# Patient Record
Sex: FEMALE | Race: WHITE | Employment: UNEMPLOYED | URBAN - METROPOLITAN AREA
[De-identification: names, ages, dates, MRNs, and addresses within clinical notes are randomized per-mention and may not be internally consistent; named-entity substitution may affect disease eponyms.]

---

## 2021-07-10 ENCOUNTER — HOSPITAL ENCOUNTER (EMERGENCY)
Age: 86
Discharge: HOME | End: 2021-07-10
Attending: EMERGENCY MEDICINE
Payer: MEDICARE

## 2021-07-10 ENCOUNTER — APPOINTMENT (OUTPATIENT)
Dept: ULTRASOUND IMAGING | Age: 86
End: 2021-07-10
Payer: MEDICARE

## 2021-07-10 ENCOUNTER — APPOINTMENT (OUTPATIENT)
Dept: GENERAL RADIOLOGY | Age: 86
End: 2021-07-10
Payer: MEDICARE

## 2021-07-10 VITALS
OXYGEN SATURATION: 99 % | WEIGHT: 123 LBS | BODY MASS INDEX: 19.3 KG/M2 | SYSTOLIC BLOOD PRESSURE: 194 MMHG | HEART RATE: 70 BPM | RESPIRATION RATE: 16 BRPM | TEMPERATURE: 98.4 F | HEIGHT: 67 IN | DIASTOLIC BLOOD PRESSURE: 67 MMHG

## 2021-07-10 DIAGNOSIS — M17.12 OSTEOARTHRITIS OF LEFT KNEE, UNSPECIFIED OSTEOARTHRITIS TYPE: Primary | ICD-10-CM

## 2021-07-10 LAB
ALBUMIN SERPL-MCNC: 4 G/DL (ref 3.5–4.6)
ALP BLD-CCNC: 101 U/L (ref 40–130)
ALT SERPL-CCNC: 6 U/L (ref 0–33)
ANION GAP SERPL CALCULATED.3IONS-SCNC: 11 MEQ/L (ref 9–15)
AST SERPL-CCNC: 14 U/L (ref 0–35)
BASOPHILS ABSOLUTE: 0 K/UL (ref 0–0.1)
BASOPHILS RELATIVE PERCENT: 0.5 % (ref 0.1–1.2)
BILIRUB SERPL-MCNC: 0.7 MG/DL (ref 0.2–0.7)
BUN BLDV-MCNC: 16 MG/DL (ref 8–23)
CALCIUM SERPL-MCNC: 9.2 MG/DL (ref 8.5–9.9)
CHLORIDE BLD-SCNC: 99 MEQ/L (ref 95–107)
CO2: 26 MEQ/L (ref 20–31)
CREAT SERPL-MCNC: 0.45 MG/DL (ref 0.5–0.9)
D DIMER: 0.96 MG/L FEU (ref 0–0.5)
EOSINOPHILS ABSOLUTE: 0 K/UL (ref 0–0.4)
EOSINOPHILS RELATIVE PERCENT: 0.7 % (ref 0.7–5.8)
GFR AFRICAN AMERICAN: >60
GFR NON-AFRICAN AMERICAN: >60
GLOBULIN: 3.3 G/DL (ref 2.3–3.5)
GLUCOSE BLD-MCNC: 111 MG/DL (ref 70–99)
HCT VFR BLD CALC: 39.3 % (ref 37–47)
HEMOGLOBIN: 12.7 G/DL (ref 11.2–15.7)
IMMATURE GRANULOCYTES #: 0 K/UL
IMMATURE GRANULOCYTES %: 0.2 %
LYMPHOCYTES ABSOLUTE: 1.5 K/UL (ref 1.2–3.7)
LYMPHOCYTES RELATIVE PERCENT: 24.9 %
MCH RBC QN AUTO: 28.8 PG (ref 25.6–32.2)
MCHC RBC AUTO-ENTMCNC: 32.3 % (ref 32.2–35.5)
MCV RBC AUTO: 89.1 FL (ref 79.4–94.8)
MONOCYTES ABSOLUTE: 0.6 K/UL (ref 0.2–0.9)
MONOCYTES RELATIVE PERCENT: 9.7 % (ref 4.7–12.5)
NEUTROPHILS ABSOLUTE: 3.8 K/UL (ref 1.6–6.1)
NEUTROPHILS RELATIVE PERCENT: 64 % (ref 34–71.1)
PDW BLD-RTO: 14.5 % (ref 11.7–14.4)
PLATELET # BLD: 240 K/UL (ref 182–369)
POTASSIUM SERPL-SCNC: 4.4 MEQ/L (ref 3.4–4.9)
RBC # BLD: 4.41 M/UL (ref 3.93–5.22)
SODIUM BLD-SCNC: 136 MEQ/L (ref 135–144)
TOTAL PROTEIN: 7.3 G/DL (ref 6.3–8)
WBC # BLD: 5.9 K/UL (ref 4–10)

## 2021-07-10 PROCEDURE — 6370000000 HC RX 637 (ALT 250 FOR IP): Performed by: EMERGENCY MEDICINE

## 2021-07-10 PROCEDURE — 73564 X-RAY EXAM KNEE 4 OR MORE: CPT

## 2021-07-10 PROCEDURE — 85025 COMPLETE CBC W/AUTO DIFF WBC: CPT

## 2021-07-10 PROCEDURE — 99284 EMERGENCY DEPT VISIT MOD MDM: CPT

## 2021-07-10 PROCEDURE — 85379 FIBRIN DEGRADATION QUANT: CPT

## 2021-07-10 PROCEDURE — 80053 COMPREHEN METABOLIC PANEL: CPT

## 2021-07-10 PROCEDURE — 36415 COLL VENOUS BLD VENIPUNCTURE: CPT

## 2021-07-10 PROCEDURE — 93971 EXTREMITY STUDY: CPT

## 2021-07-10 RX ORDER — KETOROLAC TROMETHAMINE 10 MG/1
20 TABLET, FILM COATED ORAL ONCE
Status: COMPLETED | OUTPATIENT
Start: 2021-07-10 | End: 2021-07-10

## 2021-07-10 RX ORDER — LEVOTHYROXINE SODIUM 0.03 MG/1
25 TABLET ORAL DAILY
COMMUNITY

## 2021-07-10 RX ORDER — LISINOPRIL 40 MG/1
40 TABLET ORAL DAILY
COMMUNITY

## 2021-07-10 RX ORDER — KETOROLAC TROMETHAMINE 10 MG/1
10 TABLET, FILM COATED ORAL EVERY 6 HOURS PRN
Qty: 20 TABLET | Refills: 0 | Status: SHIPPED | OUTPATIENT
Start: 2021-07-10

## 2021-07-10 RX ORDER — AMLODIPINE BESYLATE 10 MG/1
TABLET ORAL
COMMUNITY

## 2021-07-10 RX ORDER — TRAMADOL HYDROCHLORIDE 50 MG/1
50 TABLET ORAL PRN
COMMUNITY

## 2021-07-10 RX ORDER — PREDNISONE 10 MG/1
60 TABLET ORAL DAILY
Qty: 30 TABLET | Refills: 0 | Status: SHIPPED | OUTPATIENT
Start: 2021-07-10 | End: 2021-07-15

## 2021-07-10 RX ORDER — PREDNISONE 20 MG/1
40 TABLET ORAL ONCE
Status: COMPLETED | OUTPATIENT
Start: 2021-07-10 | End: 2021-07-10

## 2021-07-10 RX ADMIN — PREDNISONE 40 MG: 20 TABLET ORAL at 14:20

## 2021-07-10 RX ADMIN — KETOROLAC TROMETHAMINE 20 MG: 10 TABLET, FILM COATED ORAL at 14:19

## 2021-07-10 ASSESSMENT — ENCOUNTER SYMPTOMS
SORE THROAT: 0
COLOR CHANGE: 0
BACK PAIN: 0
PHOTOPHOBIA: 0
COUGH: 0
SINUS PRESSURE: 0
SHORTNESS OF BREATH: 0
DIARRHEA: 0
APNEA: 0
RHINORRHEA: 0
EYE PAIN: 0
CONSTIPATION: 0
ABDOMINAL PAIN: 0
ABDOMINAL DISTENTION: 0
VOMITING: 0
NAUSEA: 0
WHEEZING: 0

## 2021-07-10 ASSESSMENT — PAIN SCALES - GENERAL
PAINLEVEL_OUTOF10: 10
PAINLEVEL_OUTOF10: 10

## 2021-07-10 ASSESSMENT — PAIN DESCRIPTION - FREQUENCY: FREQUENCY: CONTINUOUS

## 2021-07-10 ASSESSMENT — PAIN DESCRIPTION - ORIENTATION: ORIENTATION: LEFT

## 2021-07-10 ASSESSMENT — PAIN DESCRIPTION - ONSET: ONSET: ON-GOING

## 2021-07-10 ASSESSMENT — PAIN DESCRIPTION - DESCRIPTORS: DESCRIPTORS: CONSTANT

## 2021-07-10 ASSESSMENT — PAIN DESCRIPTION - LOCATION: LOCATION: LEG

## 2021-07-10 ASSESSMENT — PAIN DESCRIPTION - PAIN TYPE: TYPE: ACUTE PAIN

## 2021-07-10 NOTE — ED NOTES
[facility] from [address] called her eta is 35 minutes.   Nithya Almanzar  07/10/21 3308

## 2021-07-10 NOTE — ED PROVIDER NOTES
[facility] ED  eMERGENCY dEPARTMENT eNCOUnter      Pt Name: Clovis Lizarraga  MRN: [id number]  [place] 4/25/1929  Date of evaluation: 7/10/2021  Provider: Senthil Osei MD    [address] [place] [address]       Chief Complaint   Patient presents with    Leg Pain     Pt's had leg pain x 2-3 days, pain is in the left knee and radiates up into hip area, denies injury         HISTORY OF PRESENT ILLNESS   (Location/Symptom, Timing/Onset,Context/Setting, Quality, Duration, Modifying Factors, Severity)  Note limiting factors. Clovis Lizarraga is a 80 y.o. female who presents to the emergency department with complaint of left knee pain of 2 to 3 days duration. Patient has history of bilateral knee arthritis. She uses wheelchair at home. Denies any new injuries. Denies fever or chills. Pain is 7 in a scale of 1-10 and worse with movement. Pain is sharp. Pain does not radiate. HPI    Nursing Notes were reviewed. REVIEW OF SYSTEMS    (2-9 systems for level 4, 10 or more for level 5)     Review of Systems   Constitutional: Negative. Negative for activity change, appetite change, chills, fatigue and fever. HENT: Negative for congestion, ear discharge, ear pain, hearing loss, rhinorrhea, sinus pressure and sore throat. Eyes: Negative for photophobia, pain and visual disturbance. Respiratory: Negative for apnea, cough, shortness of breath and wheezing. Cardiovascular: Negative for chest pain, palpitations and leg swelling. Gastrointestinal: Negative for abdominal distention, abdominal pain, constipation, diarrhea, nausea and vomiting. Endocrine: Negative for cold intolerance, heat intolerance and polyuria. Genitourinary: Negative for dysuria, flank pain, frequency and urgency. Musculoskeletal: Positive for arthralgias and gait problem. Negative for back pain, myalgias and neck stiffness. Skin: Negative for color change, pallor and rash.    Allergic/Immunologic: Negative for food allergies and immunocompromised state. Neurological: Negative for dizziness, tremors, syncope, weakness, light-headedness and headaches. Psychiatric/Behavioral: Negative for agitation, confusion and hallucinations. All other systems reviewed and are negative. Except as noted above the remainder of the review of systems was reviewed and negative. PAST MEDICAL HISTORY     Past Medical History:   Diagnosis Date    Hypertension     Osteoarthritis     Restless leg syndrome     Thyroid disease          SURGICAL HISTORY       Past Surgical History:   Procedure Laterality Date    ABDOMEN SURGERY  1963         CURRENT MEDICATIONS       Previous Medications    AMLODIPINE (NORVASC) 10 MG TABLET    Take by mouth    CHOLECALCIFEROL (VITAMIN D3) 125 MCG (5000 UT) TABS    Take 1 tablet by mouth daily    LEVOTHYROXINE (SYNTHROID) 25 MCG TABLET    Take 25 mcg by mouth Daily    LISINOPRIL (PRINIVIL;ZESTRIL) 40 MG TABLET    Take 40 mg by mouth daily    TRAMADOL (ULTRAM) 50 MG TABLET    Take 50 mg by mouth as needed for Pain. ALLERGIES     Patient has no known allergies. FAMILY HISTORY       Family History   Problem Relation Age of Onset    Heart Disease Mother     Coronary Art Dis Mother     Cancer Father           SOCIAL HISTORY       Social History     Socioeconomic History    Marital status:       Spouse name: Not on file    Number of children: Not on file    Years of education: Not on file    Highest education level: Not on file   Occupational History    Not on file   Tobacco Use    Smoking status: Never Smoker    Smokeless tobacco: Never Used   Vaping Use    Vaping Use: Never used   Substance and Sexual Activity    Alcohol use: Never    Drug use: Never    Sexual activity: Not on file   Other Topics Concern    Not on file   Social History Narrative    Not on file     Social Determinants of Health     Financial Resource Strain:     Difficulty of Paying Living Expenses:    Food Insecurity:  Worried About [facility] in the Last Year:    Lino Coley in the Last Year:    Transportation Needs:     Lack of Transportation (Medical):  Lack of Transportation (Non-Medical):    Physical Activity:     Days of Exercise per Week:     Minutes of Exercise per Session:    Stress:     Feeling of Stress :    Social Connections:     Frequency of Communication with Friends and Family:     Frequency of Social Gatherings with Friends and Family:     Attends Sikhism Services:     Active Member of Clubs or Organizations:     Attends Club or Organization Meetings:     Marital Status:    Intimate Partner Violence:     Fear of Current or Ex-Partner:     Emotionally Abused:     Physically Abused:     Sexually Abused:        SCREENINGS             PHYSICAL EXAM    (up to 7 for level 4, 8 or more for level 5)     ED Triage Vitals [07/10/21 1357]   BP Temp Temp Source Pulse Resp SpO2 Height Weight   (!) 174/107 98.4 °F (36.9 °C) Oral 88 16 98 % 5' 7\" (1.702 m) 123 lb (55.8 kg)       Physical Exam  Vitals and nursing note reviewed. Constitutional:       General: She is not in acute distress. Appearance: Normal appearance. She is well-developed and normal weight. She is not ill-appearing, toxic-appearing or diaphoretic. HENT:      Head: Normocephalic and atraumatic. Nose: Nose normal. No congestion or rhinorrhea. Mouth/Throat:      Mouth: Mucous membranes are moist.      Pharynx: Oropharynx is clear. No oropharyngeal exudate or posterior oropharyngeal erythema. Eyes:      General: No scleral icterus. Right eye: No discharge. Left eye: No discharge. Extraocular Movements: Extraocular movements intact. Conjunctiva/sclera: Conjunctivae normal.      Pupils: Pupils are equal, round, and reactive to light. Neck:      Thyroid: No thyromegaly. Vascular: No carotid bruit or JVD. Trachea: No tracheal deviation.    Cardiovascular:      Rate and Rhythm: Department Physician who either signs or Co-signs this chart in the absence of a cardiologist.        RADIOLOGY:   Non-plain film images such as CT, Ultrasound and MRI are read by the radiologist. Nam Aviles radiographicimages are visualized and preliminarily interpreted by the emergency physician with the below findings:        Interpretation per the Radiologist below, if available at the time of this note:    XR KNEE LEFT (MIN 4 VIEWS)   Final Result   There are and states degenerative osteoarthritic changes of the lateral compartment with bone-on-bone appearance. US DUP LOWER EXTREMITY LEFT SUMMER    (Results Pending)         ED BEDSIDE ULTRASOUND:   Performed by ED Physician - none    LABS:  Labs Reviewed   D-DIMER, QUANTITATIVE - Abnormal; Notable for the following components:       Result Value    D-Dimer, Quant 0.96 (*)     All other components within normal limits    Narrative:     Gerardo MCCLELLAN tel. [phone],  Coag results called to and read back by Singh, 07/10/2021 14:50, by [address] - Abnormal; Notable for the following components:    Glucose 111 (*)     CREATININE 0.45 (*)     All other components within normal limits   CBC WITH AUTO DIFFERENTIAL - Abnormal; Notable for the following components:    RDW 14.5 (*)     All other components within normal limits       All other labs were within normal range or not returned as of this dictation.     EMERGENCY DEPARTMENT COURSE and DIFFERENTIALDIAGNOSIS/MDM:   Vitals:    Vitals:    07/10/21 1357 07/10/21 1556   BP: (!) 174/107 (!) 194/67   Pulse: 88 70   Resp: 16 16   Temp: 98.4 °F (36.9 °C)    TempSrc: Oral    SpO2: 98% 99%   Weight: 123 lb (55.8 kg)    Height: 5' 7\" (1.702 m)            MDM  Number of Diagnoses or Management Options     Amount and/or Complexity of Data Reviewed  Clinical lab tests: reviewed and ordered  Tests in the radiology section of CPT®: reviewed and ordered    Risk of Complications, Morbidity, and/or Mortality  Presenting problems: moderate  Diagnostic procedures: moderate  Management options: moderate    Patient Progress  Patient progress: improved      CRITICAL CARE TIME   Total Critical Care time was  minutes, excluding separately reportable procedures. There was a high probability of clinically significant/life threatening deterioration in the patient's condition which required my urgentintervention. CONSULTS:  None    PROCEDURES:  Unless otherwise noted below, none     Procedures    FINAL IMPRESSION      1.  Osteoarthritis of left knee, unspecified osteoarthritis type          DISPOSITION/PLAN   DISPOSITION Decision To Discharge 07/10/2021 04:37:41 PM      PATIENT REFERRED TO:  Tasia Eid MD  [address] [place] [address]  [phone]    In 3 days      Tasia Eid MD  [address] [place] [address]  [phone]            DISCHARGE MEDICATIONS:  New Prescriptions    KETOROLAC (TORADOL) 10 MG TABLET    Take 1 tablet by mouth every 6 hours as needed for Pain    PREDNISONE (DELTASONE) 10 MG TABLET    Take 6 tablets by mouth daily for 5 doses          (Please note that portions of this note were completed with a voice recognitionprogram.  Efforts were made to edit the dictations but occasionally words are mis-transcribed.)    Nathanael Govea MD (electronically signed)  Attending Emergency Physician          Nathanael Govea MD  07/10/21 0057
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
## 2023-06-28 LAB
ANION GAP IN SER/PLAS: 13 MMOL/L (ref 10–20)
CALCIUM (MG/DL) IN SER/PLAS: 9 MG/DL (ref 8.6–10.3)
CARBON DIOXIDE, TOTAL (MMOL/L) IN SER/PLAS: 28 MMOL/L (ref 21–32)
CHLORIDE (MMOL/L) IN SER/PLAS: 99 MMOL/L (ref 98–107)
CREATININE (MG/DL) IN SER/PLAS: 0.58 MG/DL (ref 0.5–1.05)
GFR FEMALE: 84 ML/MIN/1.73M2
GLUCOSE (MG/DL) IN SER/PLAS: 90 MG/DL (ref 74–99)
POTASSIUM (MMOL/L) IN SER/PLAS: 4 MMOL/L (ref 3.5–5.3)
SODIUM (MMOL/L) IN SER/PLAS: 136 MMOL/L (ref 136–145)
UREA NITROGEN (MG/DL) IN SER/PLAS: 14 MG/DL (ref 6–23)

## 2024-07-18 ENCOUNTER — LAB (OUTPATIENT)
Dept: LAB | Facility: LAB | Age: 89
End: 2024-07-18
Payer: MEDICARE

## 2024-07-18 DIAGNOSIS — E78.5 HYPERLIPIDEMIA, UNSPECIFIED HYPERLIPIDEMIA TYPE: ICD-10-CM

## 2024-07-18 DIAGNOSIS — K21.9 GASTROESOPHAGEAL REFLUX DISEASE WITHOUT ESOPHAGITIS: ICD-10-CM

## 2024-07-18 DIAGNOSIS — I10 HYPERTENSION, UNSPECIFIED TYPE: ICD-10-CM

## 2024-07-18 DIAGNOSIS — E55.9 VITAMIN D DEFICIENCY: ICD-10-CM

## 2024-07-18 DIAGNOSIS — E03.9 HYPOTHYROIDISM, UNSPECIFIED TYPE: ICD-10-CM

## 2024-07-18 DIAGNOSIS — M19.91 PRIMARY OSTEOARTHRITIS, UNSPECIFIED SITE: ICD-10-CM

## 2024-07-18 DIAGNOSIS — Z13.220 SCREENING FOR LIPOID DISORDERS: ICD-10-CM

## 2024-07-18 LAB
25(OH)D3 SERPL-MCNC: 33 NG/ML (ref 30–100)
ALBUMIN SERPL BCP-MCNC: 3.7 G/DL (ref 3.4–5)
ALP SERPL-CCNC: 81 U/L (ref 33–136)
ALT SERPL W P-5'-P-CCNC: 7 U/L (ref 7–45)
ANION GAP SERPL CALC-SCNC: 13 MMOL/L (ref 10–20)
AST SERPL W P-5'-P-CCNC: 13 U/L (ref 9–39)
BASOPHILS # BLD AUTO: 0.05 X10*3/UL (ref 0–0.1)
BASOPHILS NFR BLD AUTO: 0.7 %
BILIRUB SERPL-MCNC: 1 MG/DL (ref 0–1.2)
BUN SERPL-MCNC: 11 MG/DL (ref 6–23)
CALCIUM SERPL-MCNC: 8.8 MG/DL (ref 8.6–10.3)
CHLORIDE SERPL-SCNC: 103 MMOL/L (ref 98–107)
CHOLEST SERPL-MCNC: 228 MG/DL (ref 0–199)
CHOLESTEROL/HDL RATIO: 4.3
CO2 SERPL-SCNC: 25 MMOL/L (ref 21–32)
CREAT SERPL-MCNC: 0.56 MG/DL (ref 0.5–1.05)
EGFRCR SERPLBLD CKD-EPI 2021: 84 ML/MIN/1.73M*2
EOSINOPHIL # BLD AUTO: 0.19 X10*3/UL (ref 0–0.4)
EOSINOPHIL NFR BLD AUTO: 2.8 %
ERYTHROCYTE [DISTWIDTH] IN BLOOD BY AUTOMATED COUNT: 14.7 % (ref 11.5–14.5)
GLUCOSE SERPL-MCNC: 98 MG/DL (ref 74–99)
HCT VFR BLD AUTO: 38.6 % (ref 36–46)
HDLC SERPL-MCNC: 53.1 MG/DL
HGB BLD-MCNC: 12.6 G/DL (ref 12–16)
IMM GRANULOCYTES # BLD AUTO: 0.01 X10*3/UL (ref 0–0.5)
IMM GRANULOCYTES NFR BLD AUTO: 0.1 % (ref 0–0.9)
LDLC SERPL CALC-MCNC: 153 MG/DL
LYMPHOCYTES # BLD AUTO: 1.52 X10*3/UL (ref 0.8–3)
LYMPHOCYTES NFR BLD AUTO: 22.1 %
MCH RBC QN AUTO: 29.2 PG (ref 26–34)
MCHC RBC AUTO-ENTMCNC: 32.6 G/DL (ref 32–36)
MCV RBC AUTO: 90 FL (ref 80–100)
MONOCYTES # BLD AUTO: 0.91 X10*3/UL (ref 0.05–0.8)
MONOCYTES NFR BLD AUTO: 13.2 %
NEUTROPHILS # BLD AUTO: 4.21 X10*3/UL (ref 1.6–5.5)
NEUTROPHILS NFR BLD AUTO: 61.1 %
NON HDL CHOLESTEROL: 175 MG/DL (ref 0–149)
NRBC BLD-RTO: 0 /100 WBCS (ref 0–0)
PLATELET # BLD AUTO: 281 X10*3/UL (ref 150–450)
POTASSIUM SERPL-SCNC: 3.9 MMOL/L (ref 3.5–5.3)
PROT SERPL-MCNC: 6.6 G/DL (ref 6.4–8.2)
RBC # BLD AUTO: 4.31 X10*6/UL (ref 4–5.2)
SODIUM SERPL-SCNC: 137 MMOL/L (ref 136–145)
TRIGL SERPL-MCNC: 110 MG/DL (ref 0–149)
TSH SERPL-ACNC: 4.2 MIU/L (ref 0.44–3.98)
VLDL: 22 MG/DL (ref 0–40)
WBC # BLD AUTO: 6.9 X10*3/UL (ref 4.4–11.3)

## 2024-07-18 PROCEDURE — 80061 LIPID PANEL: CPT

## 2024-07-18 PROCEDURE — 80053 COMPREHEN METABOLIC PANEL: CPT

## 2024-07-18 PROCEDURE — 36415 COLL VENOUS BLD VENIPUNCTURE: CPT

## 2024-07-18 PROCEDURE — 82306 VITAMIN D 25 HYDROXY: CPT

## 2024-07-18 PROCEDURE — 85025 COMPLETE CBC W/AUTO DIFF WBC: CPT

## 2024-07-18 PROCEDURE — 84443 ASSAY THYROID STIM HORMONE: CPT

## 2025-04-05 ENCOUNTER — HOSPITAL ENCOUNTER (EMERGENCY)
Facility: HOSPITAL | Age: OVER 89
Discharge: LEFT BEFORE DISCHARGE | End: 2025-04-05
Attending: STUDENT IN AN ORGANIZED HEALTH CARE EDUCATION/TRAINING PROGRAM
Payer: MEDICARE

## 2025-04-05 ENCOUNTER — APPOINTMENT (OUTPATIENT)
Dept: RADIOLOGY | Facility: HOSPITAL | Age: OVER 89
End: 2025-04-05
Payer: MEDICARE

## 2025-04-05 ENCOUNTER — APPOINTMENT (OUTPATIENT)
Dept: CARDIOLOGY | Facility: HOSPITAL | Age: OVER 89
End: 2025-04-05
Payer: MEDICARE

## 2025-04-05 VITALS
DIASTOLIC BLOOD PRESSURE: 73 MMHG | SYSTOLIC BLOOD PRESSURE: 146 MMHG | HEIGHT: 63 IN | BODY MASS INDEX: 20.38 KG/M2 | WEIGHT: 115 LBS | HEART RATE: 109 BPM | TEMPERATURE: 97.9 F | RESPIRATION RATE: 18 BRPM | OXYGEN SATURATION: 95 %

## 2025-04-05 DIAGNOSIS — R07.9 CHEST PAIN, UNSPECIFIED TYPE: ICD-10-CM

## 2025-04-05 DIAGNOSIS — R79.89 ELEVATED TROPONIN: Primary | ICD-10-CM

## 2025-04-05 LAB
ALBUMIN SERPL BCP-MCNC: 3.9 G/DL (ref 3.4–5)
ALP SERPL-CCNC: 88 U/L (ref 33–136)
ALT SERPL W P-5'-P-CCNC: 7 U/L (ref 7–45)
ANION GAP SERPL CALC-SCNC: 12 MMOL/L (ref 10–20)
AST SERPL W P-5'-P-CCNC: 14 U/L (ref 9–39)
BASOPHILS # BLD AUTO: 0.04 X10*3/UL (ref 0–0.1)
BASOPHILS NFR BLD AUTO: 0.5 %
BILIRUB SERPL-MCNC: 0.8 MG/DL (ref 0–1.2)
BNP SERPL-MCNC: 138 PG/ML (ref 0–99)
BUN SERPL-MCNC: 14 MG/DL (ref 6–23)
CALCIUM SERPL-MCNC: 9 MG/DL (ref 8.6–10.3)
CARDIAC TROPONIN I PNL SERPL HS: 120 NG/L (ref 0–13)
CARDIAC TROPONIN I PNL SERPL HS: 34 NG/L (ref 0–13)
CHLORIDE SERPL-SCNC: 98 MMOL/L (ref 98–107)
CO2 SERPL-SCNC: 26 MMOL/L (ref 21–32)
CREAT SERPL-MCNC: 0.52 MG/DL (ref 0.5–1.05)
EGFRCR SERPLBLD CKD-EPI 2021: 86 ML/MIN/1.73M*2
EOSINOPHIL # BLD AUTO: 0.02 X10*3/UL (ref 0–0.4)
EOSINOPHIL NFR BLD AUTO: 0.2 %
ERYTHROCYTE [DISTWIDTH] IN BLOOD BY AUTOMATED COUNT: 14.7 % (ref 11.5–14.5)
FLUAV RNA RESP QL NAA+PROBE: NOT DETECTED
FLUBV RNA RESP QL NAA+PROBE: NOT DETECTED
GLUCOSE SERPL-MCNC: 104 MG/DL (ref 74–99)
HCT VFR BLD AUTO: 38.8 % (ref 36–46)
HGB BLD-MCNC: 12.8 G/DL (ref 12–16)
IMM GRANULOCYTES # BLD AUTO: 0.03 X10*3/UL (ref 0–0.5)
IMM GRANULOCYTES NFR BLD AUTO: 0.4 % (ref 0–0.9)
LYMPHOCYTES # BLD AUTO: 1.33 X10*3/UL (ref 0.8–3)
LYMPHOCYTES NFR BLD AUTO: 15.6 %
MCH RBC QN AUTO: 29.4 PG (ref 26–34)
MCHC RBC AUTO-ENTMCNC: 33 G/DL (ref 32–36)
MCV RBC AUTO: 89 FL (ref 80–100)
MONOCYTES # BLD AUTO: 0.81 X10*3/UL (ref 0.05–0.8)
MONOCYTES NFR BLD AUTO: 9.5 %
NEUTROPHILS # BLD AUTO: 6.28 X10*3/UL (ref 1.6–5.5)
NEUTROPHILS NFR BLD AUTO: 73.8 %
NRBC BLD-RTO: 0 /100 WBCS (ref 0–0)
PLATELET # BLD AUTO: 268 X10*3/UL (ref 150–450)
POTASSIUM SERPL-SCNC: 3.6 MMOL/L (ref 3.5–5.3)
PROT SERPL-MCNC: 7.2 G/DL (ref 6.4–8.2)
RBC # BLD AUTO: 4.36 X10*6/UL (ref 4–5.2)
RSV RNA RESP QL NAA+PROBE: NOT DETECTED
SARS-COV-2 RNA RESP QL NAA+PROBE: NOT DETECTED
SODIUM SERPL-SCNC: 132 MMOL/L (ref 136–145)
WBC # BLD AUTO: 8.5 X10*3/UL (ref 4.4–11.3)

## 2025-04-05 PROCEDURE — 71045 X-RAY EXAM CHEST 1 VIEW: CPT

## 2025-04-05 PROCEDURE — 2550000001 HC RX 255 CONTRASTS: Performed by: STUDENT IN AN ORGANIZED HEALTH CARE EDUCATION/TRAINING PROGRAM

## 2025-04-05 PROCEDURE — 84484 ASSAY OF TROPONIN QUANT: CPT | Performed by: STUDENT IN AN ORGANIZED HEALTH CARE EDUCATION/TRAINING PROGRAM

## 2025-04-05 PROCEDURE — 93005 ELECTROCARDIOGRAM TRACING: CPT

## 2025-04-05 PROCEDURE — 83880 ASSAY OF NATRIURETIC PEPTIDE: CPT | Performed by: STUDENT IN AN ORGANIZED HEALTH CARE EDUCATION/TRAINING PROGRAM

## 2025-04-05 PROCEDURE — 36415 COLL VENOUS BLD VENIPUNCTURE: CPT | Performed by: STUDENT IN AN ORGANIZED HEALTH CARE EDUCATION/TRAINING PROGRAM

## 2025-04-05 PROCEDURE — 84075 ASSAY ALKALINE PHOSPHATASE: CPT | Performed by: STUDENT IN AN ORGANIZED HEALTH CARE EDUCATION/TRAINING PROGRAM

## 2025-04-05 PROCEDURE — 85025 COMPLETE CBC W/AUTO DIFF WBC: CPT | Performed by: STUDENT IN AN ORGANIZED HEALTH CARE EDUCATION/TRAINING PROGRAM

## 2025-04-05 PROCEDURE — 99285 EMERGENCY DEPT VISIT HI MDM: CPT | Mod: 25 | Performed by: STUDENT IN AN ORGANIZED HEALTH CARE EDUCATION/TRAINING PROGRAM

## 2025-04-05 PROCEDURE — 71275 CT ANGIOGRAPHY CHEST: CPT | Performed by: RADIOLOGY

## 2025-04-05 PROCEDURE — 96374 THER/PROPH/DIAG INJ IV PUSH: CPT | Mod: 59

## 2025-04-05 PROCEDURE — 87637 SARSCOV2&INF A&B&RSV AMP PRB: CPT | Performed by: STUDENT IN AN ORGANIZED HEALTH CARE EDUCATION/TRAINING PROGRAM

## 2025-04-05 PROCEDURE — 71045 X-RAY EXAM CHEST 1 VIEW: CPT | Performed by: RADIOLOGY

## 2025-04-05 PROCEDURE — 2500000001 HC RX 250 WO HCPCS SELF ADMINISTERED DRUGS (ALT 637 FOR MEDICARE OP): Performed by: STUDENT IN AN ORGANIZED HEALTH CARE EDUCATION/TRAINING PROGRAM

## 2025-04-05 PROCEDURE — 2500000004 HC RX 250 GENERAL PHARMACY W/ HCPCS (ALT 636 FOR OP/ED): Performed by: STUDENT IN AN ORGANIZED HEALTH CARE EDUCATION/TRAINING PROGRAM

## 2025-04-05 PROCEDURE — 71275 CT ANGIOGRAPHY CHEST: CPT

## 2025-04-05 RX ORDER — NAPROXEN SODIUM 220 MG/1
324 TABLET, FILM COATED ORAL ONCE
Status: COMPLETED | OUTPATIENT
Start: 2025-04-05 | End: 2025-04-05

## 2025-04-05 RX ORDER — ONDANSETRON HYDROCHLORIDE 2 MG/ML
4 INJECTION, SOLUTION INTRAVENOUS ONCE
Status: COMPLETED | OUTPATIENT
Start: 2025-04-05 | End: 2025-04-05

## 2025-04-05 RX ADMIN — ASPIRIN 324 MG: 81 TABLET, CHEWABLE ORAL at 17:26

## 2025-04-05 RX ADMIN — ONDANSETRON 4 MG: 2 INJECTION INTRAMUSCULAR; INTRAVENOUS at 13:21

## 2025-04-05 RX ADMIN — IOHEXOL 75 ML: 350 INJECTION, SOLUTION INTRAVENOUS at 15:29

## 2025-04-05 ASSESSMENT — LIFESTYLE VARIABLES
HAVE YOU EVER FELT YOU SHOULD CUT DOWN ON YOUR DRINKING: NO
EVER FELT BAD OR GUILTY ABOUT YOUR DRINKING: NO
HAVE PEOPLE ANNOYED YOU BY CRITICIZING YOUR DRINKING: NO
EVER HAD A DRINK FIRST THING IN THE MORNING TO STEADY YOUR NERVES TO GET RID OF A HANGOVER: NO
TOTAL SCORE: 0

## 2025-04-05 ASSESSMENT — PAIN - FUNCTIONAL ASSESSMENT
PAIN_FUNCTIONAL_ASSESSMENT: 0-10
PAIN_FUNCTIONAL_ASSESSMENT: 0-10

## 2025-04-05 ASSESSMENT — COLUMBIA-SUICIDE SEVERITY RATING SCALE - C-SSRS
2. HAVE YOU ACTUALLY HAD ANY THOUGHTS OF KILLING YOURSELF?: NO
6. HAVE YOU EVER DONE ANYTHING, STARTED TO DO ANYTHING, OR PREPARED TO DO ANYTHING TO END YOUR LIFE?: NO
1. IN THE PAST MONTH, HAVE YOU WISHED YOU WERE DEAD OR WISHED YOU COULD GO TO SLEEP AND NOT WAKE UP?: NO

## 2025-04-05 ASSESSMENT — PAIN SCALES - GENERAL
PAINLEVEL_OUTOF10: 0 - NO PAIN
PAINLEVEL_OUTOF10: 0 - NO PAIN

## 2025-04-05 ASSESSMENT — PAIN DESCRIPTION - PROGRESSION: CLINICAL_PROGRESSION: NOT CHANGED

## 2025-04-05 NOTE — ED PROVIDER NOTES
HPI   Chief Complaint   Patient presents with    Shortness of Breath       Patient is a 89-year-old female with history of hypertension, hypothyroid who presents for shortness of breath.  Patient was started on hydralazine, took her first dose this morning and shortly after felt short of breath.  States this occurred at 11 AM.  States she felt slightly nauseous as well.  No chest pain, fevers, chills, cough and runny nose, vomiting, diarrhea, abdominal pain, rashes.  No history of MI, CVA, DVT or PE, cardiac stents.  Denies tobacco or alcohol.  No oxygen use at home.  States she now feels well, slightly nauseous. States she has baseline intermittent leg edema for years, no worse than normal. Patient's daughter arrived later and states that she checked her blood pressure after the patient took her hydralazine and she had a systolic of 95.  States that is much lower than typical for her.              Patient History   No past medical history on file.  No past surgical history on file.  No family history on file.  Social History     Tobacco Use    Smoking status: Not on file    Smokeless tobacco: Not on file   Substance Use Topics    Alcohol use: Not on file    Drug use: Not on file       Physical Exam   ED Triage Vitals   Temp Pulse Resp BP   -- -- -- --      SpO2 Temp src Heart Rate Source Patient Position   -- -- -- --      BP Location FiO2 (%)     -- --       Physical Exam  Constitutional:       General: She is not in acute distress.  HENT:      Head: Normocephalic.   Eyes:      Extraocular Movements: Extraocular movements intact.      Conjunctiva/sclera: Conjunctivae normal.      Pupils: Pupils are equal, round, and reactive to light.   Cardiovascular:      Rate and Rhythm: Normal rate and regular rhythm.      Pulses: Normal pulses.      Heart sounds: Normal heart sounds.   Pulmonary:      Effort: Pulmonary effort is normal.      Breath sounds: Normal breath sounds.   Abdominal:      General: There is no  distension.      Palpations: Abdomen is soft. There is no mass.      Tenderness: There is no abdominal tenderness. There is no guarding.   Musculoskeletal:         General: No deformity.      Cervical back: Normal range of motion and neck supple.      Right lower leg: No edema.      Left lower leg: No edema.   Skin:     General: Skin is warm and dry.      Findings: No lesion or rash.   Neurological:      General: No focal deficit present.      Mental Status: She is alert and oriented to person, place, and time. Mental status is at baseline.      Cranial Nerves: No cranial nerve deficit.      Sensory: No sensory deficit.      Motor: No weakness.   Psychiatric:         Mood and Affect: Mood normal.           ED Course & MDM   Diagnoses as of 04/05/25 1941   Elevated troponin   Chest pain, unspecified type                 No data recorded     [place] Coma Scale Score: 15 (04/05/25 1300 : Prince SUSANA Terrazas RN)                           Medical Decision Making  EKG on my interpretation: Rate 97, rhythm regular, axis normal, , QRS 74, QTc 454, T waves: Mild inversion in aVL, lead I, ST segments: No elevations or depressions, interpretation: Normal sinus rhythm, no STEMI    EKG on my interpretation: Rate 88, rhythm regular, axis normal, , QRS 80, QTc 438, T waves: Mild inversion in aVL/lead I/lead II, ST segments: No elevations or depressions, interpretation: Normal sinus rhythm, no STEMI    Patient is a 89-year-old female with the above-stated past medical history who presents for shortness of breath.  Patient's EKGs do not show STEMI, her troponins are elevated and uptrending.  She was given aspirin.  Patient's heart score is a 5 making her moderate risk for major adverse cardiac event.  Advise she stay in the hospital for cardiology consult and further monitoring, however she declined this.  Patient has capacity.  CMP is grossly unremarkable.  BNP is mildly elevated, though she does not appear grossly fluid  overloaded.  CBC shows no leukocytosis or anemia.  Influenza, COVID, RSV are negative.  Chest x-ray showed a perihilar density in the left suspected to be summation of shadows.  CT angio was ordered which was negative for pulmonary embolism.  It did show a nodularity, patient was vies follow-up with her primary care doctor, she stated understanding agreement.  She was advised to follow-up with gastroenterology as well given the findings related to the esophagus.  Patient is clinically stable appearing.  She has capacity.    After discussion with the patient, they elected to leave the hospital AGAINST MEDICAL ADVICE.      The patient appeared to have intact insight, judgment, and reason. In my medical opinion, the patient has the capacity to make medical decisions.   I discussed my concerns for the patient's health given that a full evaluation and treatment has not yet been completed. Counseled the patient regarding the risks of leaving AMA including possible death, permanent disability, prolonged hospitalization, and prolonged illness.   I discussed the specific benefits of additional evaluation/treatment, as well as offered alternative options in the hopes that the patient might be amenable to partial evaluation and treatment which would be medically beneficial. Ultimately, the patient elected to leave [place]. Since I was unable to convince the patient to stay, I answered all of their questions about their medical condition and counseled them to return to the ED as soon as possible to complete their evaluation, particularly if his symptoms worsen or do not improve. I emphasized that leaving AMA would not preclude him from returning to the hospital for further evaluation.     Will request the patient complete the AGAINST MEDICAL ADVICE form.    The patient was given strict return instructions to seek further treatment in the emergency department as soon as possible.    The patient expressed understanding and was in  agreement with this plan.      Disclaimer: This note was dictated using speech recognition software. Minor errors in transcription may be present. Please call if questions.     Trevin Crawford MD  [facility] Emergency Medicine  Contact on [employer]        Problems Addressed:  Chest pain, unspecified type: acute illness or injury  Elevated troponin: acute illness or injury    Amount and/or Complexity of Data Reviewed  Labs: ordered.  Radiology: ordered.  ECG/medicine tests: ordered and independent interpretation performed.        Procedure  Procedures     Trevin Crawford MD  04/05/25 1944

## 2025-04-06 LAB
ATRIAL RATE: 88 BPM
ATRIAL RATE: 97 BPM
P AXIS: 69 DEGREES
P AXIS: 95 DEGREES
P OFFSET: 168 MS
P OFFSET: 170 MS
P ONSET: 119 MS
P ONSET: 136 MS
PR INTERVAL: 176 MS
PR INTERVAL: 204 MS
Q ONSET: 221 MS
Q ONSET: 224 MS
QRS COUNT: 15 BEATS
QRS COUNT: 15 BEATS
QRS DURATION: 74 MS
QRS DURATION: 80 MS
QT INTERVAL: 358 MS
QT INTERVAL: 362 MS
QTC CALCULATION(BAZETT): 438 MS
QTC CALCULATION(BAZETT): 454 MS
QTC FREDERICIA: 411 MS
QTC FREDERICIA: 420 MS
R AXIS: -20 DEGREES
R AXIS: 21 DEGREES
T AXIS: 150 DEGREES
T AXIS: 170 DEGREES
T OFFSET: 402 MS
T OFFSET: 403 MS
VENTRICULAR RATE: 88 BPM
VENTRICULAR RATE: 97 BPM

## 2025-04-08 ENCOUNTER — APPOINTMENT (OUTPATIENT)
Dept: CARDIOLOGY | Facility: CLINIC | Age: OVER 89
End: 2025-04-08
Payer: MEDICARE

## 2025-04-08 VITALS
DIASTOLIC BLOOD PRESSURE: 62 MMHG | HEIGHT: 63 IN | SYSTOLIC BLOOD PRESSURE: 154 MMHG | BODY MASS INDEX: 20.38 KG/M2 | WEIGHT: 115 LBS

## 2025-04-08 DIAGNOSIS — R07.9 CHEST PAIN, UNSPECIFIED TYPE: ICD-10-CM

## 2025-04-08 DIAGNOSIS — R79.89 ELEVATED TROPONIN: ICD-10-CM

## 2025-04-08 DIAGNOSIS — I10 ESSENTIAL (PRIMARY) HYPERTENSION: Primary | ICD-10-CM

## 2025-04-08 PROCEDURE — 1159F MED LIST DOCD IN RCRD: CPT | Performed by: NURSE PRACTITIONER

## 2025-04-08 PROCEDURE — 3078F DIAST BP <80 MM HG: CPT | Performed by: NURSE PRACTITIONER

## 2025-04-08 PROCEDURE — 1160F RVW MEDS BY RX/DR IN RCRD: CPT | Performed by: NURSE PRACTITIONER

## 2025-04-08 PROCEDURE — 1123F ACP DISCUSS/DSCN MKR DOCD: CPT | Performed by: NURSE PRACTITIONER

## 2025-04-08 PROCEDURE — 3077F SYST BP >= 140 MM HG: CPT | Performed by: NURSE PRACTITIONER

## 2025-04-08 PROCEDURE — 1157F ADVNC CARE PLAN IN RCRD: CPT | Performed by: NURSE PRACTITIONER

## 2025-04-08 PROCEDURE — 99205 OFFICE O/P NEW HI 60 MIN: CPT | Performed by: NURSE PRACTITIONER

## 2025-04-08 RX ORDER — ISOSORBIDE MONONITRATE 30 MG/1
30 TABLET, EXTENDED RELEASE ORAL DAILY
Qty: 30 TABLET | Refills: 6 | Status: SHIPPED | OUTPATIENT
Start: 2025-04-08 | End: 2026-04-08

## 2025-04-08 RX ORDER — LEVOTHYROXINE SODIUM 25 UG/1
25 TABLET ORAL DAILY
COMMUNITY

## 2025-04-08 RX ORDER — LISINOPRIL 40 MG/1
40 TABLET ORAL DAILY
COMMUNITY

## 2025-04-08 RX ORDER — NAPROXEN SODIUM 220 MG/1
81 TABLET, FILM COATED ORAL DAILY
Qty: 30 TABLET | Refills: 6 | Status: SHIPPED | OUTPATIENT
Start: 2025-04-08 | End: 2026-04-08

## 2025-04-08 RX ORDER — OMEPRAZOLE 40 MG/1
1 CAPSULE, DELAYED RELEASE ORAL
COMMUNITY
Start: 2024-08-30

## 2025-04-08 RX ORDER — CHOLECALCIFEROL (VITAMIN D3) 25 MCG
1000 TABLET ORAL 3 TIMES WEEKLY
COMMUNITY

## 2025-04-08 RX ORDER — NITROGLYCERIN 0.4 MG/1
0.4 TABLET SUBLINGUAL EVERY 5 MIN PRN
Qty: 25 TABLET | Refills: 1 | Status: SHIPPED | OUTPATIENT
Start: 2025-04-08 | End: 2026-04-08

## 2025-04-09 NOTE — PROGRESS NOTES
Chief Complaint:  Chief Complaint   Patient presents with    New Patient Visit    Chest Pain       Julito Lemos is a 89 y.o. female that presents to the office today with her daughter and granddaughter for new patient cardiac evaluation. She has a PMH of hypertension, hypothyroidism, shortness of breath, chronic joint pain. She denies tobacco or alcohol use. Admits to 2 - 3 cups coffee daily. Stays well hydrated with water, states her diet is fair.  Her and her family report that she ambulates very little and only for very short distances. Most of her time is spent in a chair or wheelchair.      4/5/2025 [facility] with shortness of breath. She reported that she was recently started on hydralazine for blood pressure and after she took her first dose she noticed shortness of breath associated with nausea.  EKG showed NSR septal infarct, T wave abnormality.  Labs showed na 132, , Troponin 34, 120. Other labs unremarkable. Respiratory panel negative. CT chest negative for pulmonary embolism but did note Indeterminate pleural-based areas of nodularity may represent areas of rounded atelectasis. Can not exclude neoplasm for which she will follow with primary regarding. It was recommended she be admitted for observation but declined and signed out AMA.    She states that she was having symptoms of shortness of breath, nausea and dizziness, her daughter reports that she had similar symptoms approximately 1 week prior that were associated with her becoming very pale.   She states that she currently feels fine and is not experiencing any of these symptoms and that she feels fine.     I have reviewed EKG from ER along with labs with Dr. Marroquin who recommends obtaining an echocardiogram and nuclear stress test.  I have discussed recommendations with Julito and her family. Julito wishes for conservative care which her family agrees with.  I have recommended they discuss julito's wishes regarding conservative care with  her PCP.     Testing Reviewed  4/5/2025 EKG  NSR  HR 88 bpm  Septal infarct  T wave abnormality consider inferolateral ischemia  QT/Qtc 362/438    4/5/2025 CT angio chest  IMPRESSION:  1. No evidence of acute pulmonary embolism.  2. Cardiomegaly.  3. Indeterminate pleural-based areas of nodularity may represent  areas of rounded atelectasis. Can not exclude neoplasm.  Consider  follow up non contrast chest CT at 3-6 months,then consider CT chest  at 18-24 months (Phil Ward et al., Guidelines for management of  incidental pulmonary nodules detected on CT images: From the  Fleischner Society 2017, Radiology. 2017 Jul;284 (1):228-243.)  4. Distended fluid-filled mid to distal esophagus. Findings may be  due to gastroesophageal reflux. Recommend nonemergent evaluation with  esophagram to further evaluate. Esophageal pathology not excluded.    CBC:   Lab Results   Component Value Date    WBC 8.5 04/05/2025    RBC 4.36 04/05/2025    HGB 12.8 04/05/2025    HCT 38.8 04/05/2025     04/05/2025        CMP:    Lab Results   Component Value Date     (L) 04/05/2025    K 3.6 04/05/2025    CL 98 04/05/2025    CO2 26 04/05/2025    BUN 14 04/05/2025    CREATININE 0.52 04/05/2025    GLUCOSE 104 (H) 04/05/2025    CALCIUM 9.0 04/05/2025       Lipid Profile:    Lab Results   Component Value Date    TRIG 110 07/18/2024    HDL 53.1 07/18/2024    LDLCALC 153 (H) 07/18/2024    LDLDIRECT 155 (H) 08/10/2022       BMP:  Lab Results   Component Value Date     (L) 04/05/2025     07/18/2024     06/28/2023     (L) 08/10/2022     07/22/2021    K 3.6 04/05/2025    K 3.9 07/18/2024    K 4.0 06/28/2023    K 4.3 08/10/2022    K 4.2 07/22/2021    CL 98 04/05/2025     07/18/2024    CL 99 06/28/2023    CL 99 08/10/2022     07/22/2021    CO2 26 04/05/2025    CO2 25 07/18/2024    CO2 28 06/28/2023    CO2 27 08/10/2022    CO2 26 07/22/2021    BUN 14 04/05/2025    BUN 11 07/18/2024    BUN 14  06/28/2023    BUN 17 08/10/2022    BUN 13 07/22/2021    CREATININE 0.52 04/05/2025    CREATININE 0.56 07/18/2024    CREATININE 0.58 06/28/2023    CREATININE 0.55 08/10/2022    CREATININE 0.54 07/22/2021       CBC:  Lab Results   Component Value Date    WBC 8.5 04/05/2025    WBC 6.9 07/18/2024    WBC 7.0 08/10/2022    WBC 8.0 07/22/2021    WBC 7.2 09/04/2020    RBC 4.36 04/05/2025    RBC 4.31 07/18/2024    RBC 4.32 08/10/2022    RBC 4.36 07/22/2021    RBC 3.68 (L) 09/04/2020    HGB 12.8 04/05/2025    HGB 12.6 07/18/2024    HGB 12.9 08/10/2022    HGB 12.6 07/22/2021    HGB 11.0 (L) 09/04/2020    HCT 38.8 04/05/2025    HCT 38.6 07/18/2024    HCT 39.0 08/10/2022    HCT 40.5 07/22/2021    HCT 33.9 (L) 09/04/2020    MCV 89 04/05/2025    MCV 90 07/18/2024    MCV 90 08/10/2022    MCV 93 07/22/2021    MCV 92 09/04/2020    MCH 29.4 04/05/2025    MCH 29.2 07/18/2024    MCHC 33.0 04/05/2025    MCHC 32.6 07/18/2024    MCHC 33.1 08/10/2022    MCHC 31.1 (L) 07/22/2021    MCHC 32.4 09/04/2020    RDW 14.7 (H) 04/05/2025    RDW 14.7 (H) 07/18/2024    RDW 14.8 (H) 08/10/2022    RDW 14.6 (H) 07/22/2021    RDW 14.0 09/04/2020     04/05/2025     07/18/2024     08/10/2022     07/22/2021     09/04/2020       Hepatic Function Panel:    Lab Results   Component Value Date    ALKPHOS 88 04/05/2025    ALT 7 04/05/2025    AST 14 04/05/2025    PROT 7.2 04/05/2025    BILITOT 0.8 04/05/2025    BILIDIR 0.1 07/22/2021       HgBA1c:    Lab Results   Component Value Date    HGBA1C 5.4 07/22/2021     TSH:    Lab Results   Component Value Date    TSH 4.20 (H) 07/18/2024       BNP:   Lab Results   Component Value Date     (H) 04/05/2025        PT/INR:    Lab Results   Component Value Date    PROTIME 11.8 08/30/2020    INR 1.0 08/30/2020       Cardiac Enzymes:    Lab Results   Component Value Date    TROPHS 120 (HH) 04/05/2025    TROPHS 34 (H) 04/05/2025               Patient Active Problem List   Diagnosis     Essential (primary) hypertension    Elevated troponin    Chest pain       Social History     Tobacco Use    Smoking status: Never    Smokeless tobacco: Never   Substance Use Topics    Alcohol use: Never    Drug use: Never       No past medical history on file.      Current Outpatient Medications:     cholecalciferol (Vitamin D3) 25 mcg (1000 units) tablet, Take 1 tablet (1,000 Units) by mouth 3 (three) times a week., Disp: , Rfl:     levothyroxine (Synthroid, Levoxyl) 25 mcg tablet, Take 1 tablet (25 mcg) by mouth once daily., Disp: , Rfl:     lisinopril 40 mg tablet, Take 1 tablet (40 mg) by mouth once daily., Disp: , Rfl:     omeprazole (PriLOSEC) 40 mg DR capsule, Take 1 capsule (40 mg) by mouth early in the morning.., Disp: , Rfl:     aspirin 81 mg chewable tablet, Chew 1 tablet (81 mg) once daily., Disp: 30 tablet, Rfl: 6    isosorbide mononitrate ER (Imdur) 30 mg 24 hr tablet, Take 1 tablet (30 mg) by mouth once daily. Do not crush or chew., Disp: 30 tablet, Rfl: 6    nitroglycerin (Nitrostat) 0.4 mg SL tablet, Place 1 tablet (0.4 mg) under the tongue every 5 minutes if needed for chest pain. May repeat dose every 5 minutes for up to 3 doses total., Disp: 25 tablet, Rfl: 1    Statins-hmg-coa reductase inhibitors    No family history on file.    No past surgical history on file.       Review of systems  Constitutional: No weight loss, fever, chills, weakness or fatigue  HEENT: No visual loss, blurred vision, double vision or yellow sclerae  Skin: No rash or itching  Cardiovascular: No chest pain, pressure or discomfort or palpitations. Positive for lower extremity edema.  Respiratory: No shortness of breath, cough or sputum  Gastrointestinal: No nausea, vomiting or diarrhea. No bloody or dark tarry stools.  Neurological: No headache, lightheadedness, dizziness, syncope.   Musculoskeletal: Positive for chronic joint pain and stiffness.  Hematologic: No anemia, bleeding or bruising.    /62 (BP Location:  "Right arm, Patient Position: Sitting)   Ht 1.6 m (5' 3\")   Wt 52.2 kg (115 lb)   BMI 20.37 kg/m²     Physical Exam  Constitutional: Well developed, awake/alert x 3, no distress.  Head/Neck: No JVD, No bruits  Respiratory/Thorax: patent airways, CTAB, normal breath sounds with good expansion.  Cardiovascular: Regular rate and rhythm, no murmurs, normal S1 and S2,   Gastrointestinal: Non distended, soft, non-tender, no rebound tenderness or guarding.  Extremities: No cyanosis. Bilateral lower extremity edema.   Neurological: Alert and oriented x 3. Moves extremities spontaneous with purpose.  Psychological: Appropriate mood and behavior  Skin: Warm and Dry. No lesions or rashes.     Assessment/Plan  Abnormal EKG:  She declines pursuing nuclear stress test.  And request conservative care.   Start isosorbide 30  mg 1 tablet daily.  Start Asa 81 mg daily  Shortness of breath:  Obtain echocardiogram to assess for valve and pump function.  She is willing to proceed with echo with the understanding that medications may be adjusted to help manage her symptoms.   Hypertension: Blood pressure dropped very low with hydralazine.  Will add Isosorbide.   Follow in office after testing for results and further recommendations.         Please excuse any errors in grammar or translation related to dictation, voice recognition software was used to prepare this document.    "

## 2025-04-29 ENCOUNTER — APPOINTMENT (OUTPATIENT)
Dept: CARDIOLOGY | Facility: CLINIC | Age: OVER 89
End: 2025-04-29
Payer: MEDICARE

## 2025-05-19 ENCOUNTER — HOSPITAL ENCOUNTER (OUTPATIENT)
Dept: CARDIOLOGY | Facility: CLINIC | Age: OVER 89
Discharge: HOME | End: 2025-05-19
Payer: MEDICARE

## 2025-05-19 DIAGNOSIS — R79.89 ELEVATED TROPONIN: ICD-10-CM

## 2025-05-19 DIAGNOSIS — R07.9 CHEST PAIN, UNSPECIFIED TYPE: ICD-10-CM

## 2025-05-19 LAB
AORTIC VALVE MEAN GRADIENT: 19 MMHG
AORTIC VALVE PEAK VELOCITY: 3.14 M/S
AV PEAK GRADIENT: 39 MMHG
AVA (PEAK VEL): 0.75 CM2
AVA (VTI): 0.84 CM2
EJECTION FRACTION APICAL 4 CHAMBER: 52.3
EJECTION FRACTION: 53 %
LEFT ATRIUM VOLUME AREA LENGTH INDEX BSA: 38.4 ML/M2
LEFT VENTRICLE INTERNAL DIMENSION DIASTOLE: 4.3 CM (ref 3.5–6)
LEFT VENTRICULAR OUTFLOW TRACT DIAMETER: 1.76 CM
LV EJECTION FRACTION BIPLANE: 47 %
MITRAL VALVE E/A RATIO: 0.6
RIGHT VENTRICLE PEAK SYSTOLIC PRESSURE: 40.8 MMHG

## 2025-05-19 PROCEDURE — 93306 TTE W/DOPPLER COMPLETE: CPT

## 2025-05-19 PROCEDURE — 93306 TTE W/DOPPLER COMPLETE: CPT | Performed by: INTERNAL MEDICINE

## 2025-05-27 ENCOUNTER — APPOINTMENT (OUTPATIENT)
Dept: CARDIOLOGY | Facility: CLINIC | Age: OVER 89
End: 2025-05-27
Payer: MEDICARE

## 2025-05-27 VITALS
HEART RATE: 81 BPM | WEIGHT: 123 LBS | DIASTOLIC BLOOD PRESSURE: 84 MMHG | SYSTOLIC BLOOD PRESSURE: 186 MMHG | HEIGHT: 63 IN | BODY MASS INDEX: 21.79 KG/M2

## 2025-05-27 DIAGNOSIS — I35.0 NONRHEUMATIC AORTIC VALVE STENOSIS: ICD-10-CM

## 2025-05-27 DIAGNOSIS — R79.89 ELEVATED TROPONIN: ICD-10-CM

## 2025-05-27 DIAGNOSIS — M19.90 OSTEOARTHRITIS, UNSPECIFIED OSTEOARTHRITIS TYPE, UNSPECIFIED SITE: ICD-10-CM

## 2025-05-27 DIAGNOSIS — I21.4 NON-ST ELEVATION (NSTEMI) MYOCARDIAL INFARCTION (MULTI): ICD-10-CM

## 2025-05-27 DIAGNOSIS — R94.31 ABNORMAL EKG: Primary | ICD-10-CM

## 2025-05-27 DIAGNOSIS — R53.1 WEAKNESS WITH DIZZINESS: ICD-10-CM

## 2025-05-27 DIAGNOSIS — R91.1 PULMONARY NODULE: ICD-10-CM

## 2025-05-27 DIAGNOSIS — I10 ESSENTIAL (PRIMARY) HYPERTENSION: ICD-10-CM

## 2025-05-27 DIAGNOSIS — R09.89 BILATERAL CAROTID BRUITS: ICD-10-CM

## 2025-05-27 DIAGNOSIS — K21.9 GASTROESOPHAGEAL REFLUX DISEASE WITHOUT ESOPHAGITIS: ICD-10-CM

## 2025-05-27 DIAGNOSIS — E78.2 MIXED HYPERLIPIDEMIA: ICD-10-CM

## 2025-05-27 DIAGNOSIS — I35.1 NONRHEUMATIC AORTIC (VALVE) INSUFFICIENCY: ICD-10-CM

## 2025-05-27 DIAGNOSIS — E03.9 HYPOTHYROIDISM, UNSPECIFIED TYPE: ICD-10-CM

## 2025-05-27 DIAGNOSIS — R06.02 SHORTNESS OF BREATH: ICD-10-CM

## 2025-05-27 DIAGNOSIS — R42 WEAKNESS WITH DIZZINESS: ICD-10-CM

## 2025-05-27 DIAGNOSIS — R07.9 CHEST PAIN, UNSPECIFIED TYPE: ICD-10-CM

## 2025-05-27 PROCEDURE — 1036F TOBACCO NON-USER: CPT | Performed by: INTERNAL MEDICINE

## 2025-05-27 PROCEDURE — 3079F DIAST BP 80-89 MM HG: CPT | Performed by: INTERNAL MEDICINE

## 2025-05-27 PROCEDURE — 3077F SYST BP >= 140 MM HG: CPT | Performed by: INTERNAL MEDICINE

## 2025-05-27 PROCEDURE — 1159F MED LIST DOCD IN RCRD: CPT | Performed by: INTERNAL MEDICINE

## 2025-05-27 PROCEDURE — 99215 OFFICE O/P EST HI 40 MIN: CPT | Performed by: INTERNAL MEDICINE

## 2025-05-27 RX ORDER — METOPROLOL SUCCINATE 25 MG/1
25 TABLET, EXTENDED RELEASE ORAL DAILY
Qty: 90 TABLET | Refills: 3 | Status: SHIPPED | OUTPATIENT
Start: 2025-05-27 | End: 2026-05-27

## 2025-05-27 RX ORDER — ISOSORBIDE MONONITRATE 60 MG/1
60 TABLET, EXTENDED RELEASE ORAL DAILY
Qty: 90 TABLET | Refills: 3 | Status: SHIPPED | OUTPATIENT
Start: 2025-05-27 | End: 2026-05-27

## 2025-05-27 RX ORDER — AMLODIPINE BESYLATE 5 MG/1
5 TABLET ORAL DAILY
Qty: 90 TABLET | Refills: 3 | Status: SHIPPED | OUTPATIENT
Start: 2025-05-27 | End: 2026-05-27

## 2025-05-27 RX ORDER — ROSUVASTATIN CALCIUM 5 MG/1
5 TABLET, COATED ORAL NIGHTLY
Qty: 90 TABLET | Refills: 3 | Status: SHIPPED | OUTPATIENT
Start: 2025-05-27 | End: 2026-05-27

## 2025-05-27 NOTE — PROGRESS NOTES
CARDIOLOGY OFFICE NOTE     Date:   5/27/2025    Patient:    Nayana Lemos    YOB: 1929    Primary Physician: Ashwini Urena MD         REASON FOR VISIT / CHIEF COMPLAINT:     Cardiology follow-up.    HPI:     Nayana Lemos was seen in cardiac evaluation at the [address] Cardiology office May 27, 2025.      The patients problems are listed as in the impression below.    Electronic medical records reviewed.    89-year-old hypertensive, hyperlipidemic hypothyroid woman with recent emergency room evaluation for shortness of breath and chest pain.  She was noted to have elevated troponins.  CT of the chest was negative for pulmonary embolism.  She refused admission.  She was seen by Christine Israel 4/8/2025.  Echocardiogram was ordered.    She returns now and feels well.  Her blood pressure however is 200 systolic.  Repeat was 186.    She currently states that she feels well.  She is asymptomatic overall except for some generalized fatigue.    She denies any chest pain or shortness of breath currently.  She is not very active at home.    Patient denies Chest Pain, SOB, Lightheadedness, Dizziness, TIA or CVA symptoms.  No CHF or Edema.  No Palpitations.  No GI,  or Bleeding Issues. No Recent Fever or Chills.     Cardiovascular and general review of systems is otherwise negative.    A 14-system review is otherwise negative, other than noted.     PHYSICAL EXAMINATION:      Vitals:    05/27/25 1000   BP: (!) 202/80   Pulse: 81     General: No acute distress.  Alert and oriented.  Head And Neck Examination: No jugular venous distention.bilateral carotid bruits No masses. Carotid upstrokes preserved. Oral mucosa moist.  No xanthelasma. Head and neck examination otherwise unremarkable.  Lungs: Clear to auscultation and percussion. No wheezes, no rales,  and no rhonchi.  Chest: Excursion appeared to be normal. No chest wall tenderness on palpation.  Heart: Normal S1 and  S2. No S3. No S4. No rub. Grade 3/6 systolic murmur, best heard at the left sternal border. Point of maximal impulse was within normal limits.  Abdomen: Soft. Nontender. No organomegaly. No bruits. No masses.   Extremities: No bipedal edema. No clubbing. No cyanosis.  Pulses are strong throughout. No bruits.  Musculoskeletal Exam: No ulcers, otherwise unremarkable.  Neuro: Neurologically appeared grossly intact.  .  IMPRESSION:      Cardiovascular status stable   Shortness of breath  Chest pain symptoms  Non-ST elevation MI, elevated troponins, 4/2025.  Hypertension  Hyperlipidemia, , 7/2024  Systolic cardiac murmur  Aortic valve stenosis, moderate, V-max 3.6, peak mean gradients 39 and 18 respectively, echocardiogram, 5/2025.  Aortic insufficiency, moderate  Mitral annular calcification, moderate  Normal LV systolic function, LVEF 50 to 55%, echocardiogram, 5/2025  LV diastolic dysfunction  Abnormal ECG, question prior myocardial infarction  Bilateral carotid bruits  Negative CT angiography for pulmonary embolism 4/2025.  Abnormal CT of the chest with indeterminant pleural-based nodularity, 4/2025.  Hypothyroidism  Degenerative joint disease  GERD  Otherwise as per assessment below.    RECOMMENDATIONS:      The patient returns.  She is 89 years young and is really only had shortness of breath episodes.  She does have some chest discomfort which may be related to GERD.  She did have episode requiring emergency room evaluation where she had elevated troponins consistent with non-ST elevation myocardial infarction.  CT scanning was negative for pulmonary embolism.  Echocardiogram however noted preserved left ventricular function with moderate aortic valve stenosis.  She wishes conservative care.    Would suggest carotid ultrasound given her carotid bruits and 48-hour Holter monitoring.  Further recommendation rendered following.    Will add to her current medications of the following medications: Crestor 5 mg  nightly, amlodipine 5 mg daily, Toprol-XL 25 mg daily and will increase her Imdur to 60 mg daily.  She will continue other medications.  Refills were provided.    Exercise dietary program was encouraged.    Hydration.    Primordialhart portal use was encouraged.    We will plan to see back in 3 weeks with above-noted testing, laboratory Studies and ECG as ordered.     Patient will follow up with their primary physician for general care.    The patient knows to contact medical care earlier if need be.      ALLERGIES:     Statins-hmg-coa reductase inhibitors     MEDICATIONS:     Current Outpatient Medications   Medication Instructions    aspirin 81 mg, oral, Daily    cholecalciferol (VITAMIN D3) 1,000 Units, 3 times weekly    isosorbide mononitrate ER (IMDUR) 30 mg, oral, Daily, Do not crush or chew.    levothyroxine (SYNTHROID, LEVOXYL) 25 mcg, Daily    lisinopril 40 mg, Daily    nitroglycerin (NITROSTAT) 0.4 mg, sublingual, Every 5 min PRN, May repeat dose every 5 minutes for up to 3 doses total.    omeprazole (PriLOSEC) 40 mg DR capsule 1 capsule, Daily (0630)       ELECTROCARDIOGRAM:      None this visit    CARDIAC TESTING:      ECHO. 5/2025:  1. The left ventricular systolic function is low normal, with a visually estimated ejection fraction of 50-55%.  2. No regional wall motion abnormalities.    3. Spectral Doppler shows a Grade I (impaired relaxation pattern) of left ventricular diastolic filling with normal left atrial filling pressure.    4. Mild (1+) mitral regurgitation.    5. There is moderate mitral annular calcification.    6. Mild (1+) tricuspid regurgitation with estimated RVSP of 41 mmHg.    7. Calcified aortic valve leaflets with restricted leaflet mobility. Peak velocity across the aortic valve is 3.62 m/s with a peak gradient 39 mmHg and mean gradient 18 mmHg. Dimensionless index 0.34. Aortic valve area 0.84 cm2. Values consistent with moderate to severe aortic stenosis. 2+ aortic regurgitation.    8.  There is moderate to severe aortic valve cusp calcification.    9. Mild to moderate aortic valve regurgitation.   10. No previous available for comparison.     LABORATORY DATA:      CBC:   Lab Results   Component Value Date    WBC 8.5 04/05/2025    RBC 4.36 04/05/2025    HGB 12.8 04/05/2025    HCT 38.8 04/05/2025     04/05/2025        CMP:    Lab Results   Component Value Date     (L) 04/05/2025    K 3.6 04/05/2025    CL 98 04/05/2025    CO2 26 04/05/2025    BUN 14 04/05/2025    CREATININE 0.52 04/05/2025    GLUCOSE 104 (H) 04/05/2025    CALCIUM 9.0 04/05/2025       Lipid Profile:    Lab Results   Component Value Date    CHOL 228 (H) 07/18/2024    TRIG 110 07/18/2024    HDL 53.1 07/18/2024    LDLCALC 153 (H) 07/18/2024    LDLDIRECT 155 (H) 08/10/2022       Hepatic Function Panel:    Lab Results   Component Value Date    ALKPHOS 88 04/05/2025    ALT 7 04/05/2025    AST 14 04/05/2025    PROT 7.2 04/05/2025    BILITOT 0.8 04/05/2025    BILIDIR 0.1 07/22/2021       TSH:    Lab Results   Component Value Date    TSH 4.20 (H) 07/18/2024       HgBA1c:    Lab Results   Component Value Date    HGBA1C 5.4 07/22/2021       BNP:   Lab Results   Component Value Date     (H) 04/05/2025        Cardiac Enzymes:    Lab Results   Component Value Date    TROPHS 120 (HH) 04/05/2025    TROPHS 34 (H) 04/05/2025                 PROBLEM LIST:     Problem List[1]          C[facility] Haider MD, [facility]   [facility] /  Cardiology      Of Note:  [employer] voice recognition dictation software was utilized partially in the preparation of this note, therefore, inaccuracies in spelling, word choice and punctuation may have occurred which were not recognized at the time of signing.    Patient was seen and examined with total time of visit including chart preparation, rooming, and chart completion exceeding 40 minutes.      IAutumn MA am scribing for, and in the presence of Dr. Cody Haider MD, FACC.    I, Dr. Ross  SYDNEY Haider MD, FAC, personally performed the services described in the documentation as scribed by Autumn Hernandez MA in my presence, and confirm it is both accurate and complete.              [1]   Patient Active Problem List  Diagnosis    Essential (primary) hypertension    Elevated troponin    Chest pain

## 2025-05-27 NOTE — PATIENT INSTRUCTIONS
START TOPROL XL 25 MH DAILY   START AMLODIPINE 5 MG DAILY   START ROSUVASTATIN 5 MG AT BEDTIME   INCREASE IMDUR 60 MG DAILY     FASTING LABS PRIOR TO NEXT OFFICE VISIT     THE DAY YOU COME IN FOR YOUR HOLTER MONITOR BE SURE TO BATHE OR SHOWER PRIOR.  DO NOT APPLY ANY LOTIONS, OILS OR POWDERS ON YOUR CHEST AREA.  ONCE THE MONITOR IS APPLIED IT WILL REMAIN ON UNTIL THE STUDY IS DONE. YOU WILL NOT BE ABLE TO SHOWER. THE MONITOR CANNOT GET WET.

## 2025-06-26 ENCOUNTER — HOSPITAL ENCOUNTER (OUTPATIENT)
Dept: RADIOLOGY | Facility: HOSPITAL | Age: OVER 89
Discharge: HOME | End: 2025-06-26
Payer: MEDICARE

## 2025-06-26 ENCOUNTER — APPOINTMENT (OUTPATIENT)
Dept: CARDIOLOGY | Facility: CLINIC | Age: OVER 89
End: 2025-06-26
Payer: MEDICARE

## 2025-06-26 DIAGNOSIS — R42 WEAKNESS WITH DIZZINESS: ICD-10-CM

## 2025-06-26 DIAGNOSIS — E03.9 HYPOTHYROIDISM, UNSPECIFIED TYPE: ICD-10-CM

## 2025-06-26 DIAGNOSIS — M19.90 OSTEOARTHRITIS, UNSPECIFIED OSTEOARTHRITIS TYPE, UNSPECIFIED SITE: ICD-10-CM

## 2025-06-26 DIAGNOSIS — R06.02 SHORTNESS OF BREATH: ICD-10-CM

## 2025-06-26 DIAGNOSIS — R53.1 WEAKNESS WITH DIZZINESS: ICD-10-CM

## 2025-06-26 DIAGNOSIS — I35.0 NONRHEUMATIC AORTIC VALVE STENOSIS: ICD-10-CM

## 2025-06-26 DIAGNOSIS — R79.89 ELEVATED TROPONIN: ICD-10-CM

## 2025-06-26 DIAGNOSIS — I10 ESSENTIAL (PRIMARY) HYPERTENSION: ICD-10-CM

## 2025-06-26 DIAGNOSIS — R91.1 PULMONARY NODULE: ICD-10-CM

## 2025-06-26 DIAGNOSIS — K21.9 GASTROESOPHAGEAL REFLUX DISEASE WITHOUT ESOPHAGITIS: ICD-10-CM

## 2025-06-26 DIAGNOSIS — R94.31 ABNORMAL EKG: ICD-10-CM

## 2025-06-26 DIAGNOSIS — E78.2 MIXED HYPERLIPIDEMIA: ICD-10-CM

## 2025-06-26 DIAGNOSIS — R09.89 BILATERAL CAROTID BRUITS: ICD-10-CM

## 2025-06-26 DIAGNOSIS — I35.1 NONRHEUMATIC AORTIC (VALVE) INSUFFICIENCY: ICD-10-CM

## 2025-06-26 DIAGNOSIS — I21.4 NON-ST ELEVATION (NSTEMI) MYOCARDIAL INFARCTION (MULTI): ICD-10-CM

## 2025-06-26 DIAGNOSIS — R07.9 CHEST PAIN, UNSPECIFIED TYPE: ICD-10-CM

## 2025-06-26 PROCEDURE — 93880 EXTRACRANIAL BILAT STUDY: CPT

## 2025-06-26 PROCEDURE — 93880 EXTRACRANIAL BILAT STUDY: CPT | Performed by: RADIOLOGY

## 2025-06-27 LAB
ALBUMIN SERPL-MCNC: 4 G/DL (ref 3.6–5.1)
ALBUMIN/GLOB SERPL: 1.4 (CALC) (ref 1–2.5)
ALP SERPL-CCNC: 88 U/L (ref 37–153)
ALT SERPL-CCNC: 7 U/L (ref 6–29)
ANION GAP SERPL CALCULATED.4IONS-SCNC: 10 MMOL/L (CALC) (ref 7–17)
AST SERPL-CCNC: 14 U/L (ref 10–35)
BILIRUB DIRECT SERPL-MCNC: 0.2 MG/DL
BILIRUB INDIRECT SERPL-MCNC: 0.7 MG/DL (CALC) (ref 0.2–1.2)
BILIRUB SERPL-MCNC: 0.9 MG/DL (ref 0.2–1.2)
BUN SERPL-MCNC: 12 MG/DL (ref 7–25)
BUN/CREAT SERPL: 24 (CALC) (ref 6–22)
CALCIUM SERPL-MCNC: 8.8 MG/DL (ref 8.6–10.4)
CHLORIDE SERPL-SCNC: 99 MMOL/L (ref 98–110)
CHOLEST SERPL-MCNC: 249 MG/DL
CHOLEST/HDLC SERPL: 3.8 (CALC)
CO2 SERPL-SCNC: 24 MMOL/L (ref 20–32)
CREAT SERPL-MCNC: 0.5 MG/DL (ref 0.6–0.95)
EGFRCR SERPLBLD CKD-EPI 2021: 86 ML/MIN/1.73M2
ERYTHROCYTE [DISTWIDTH] IN BLOOD BY AUTOMATED COUNT: 13.5 % (ref 11–15)
GLOBULIN SER CALC-MCNC: 2.8 G/DL (CALC) (ref 1.9–3.7)
GLUCOSE SERPL-MCNC: 89 MG/DL (ref 65–99)
HCT VFR BLD AUTO: 38.5 % (ref 35–45)
HDLC SERPL-MCNC: 65 MG/DL
HGB BLD-MCNC: 12.6 G/DL (ref 11.7–15.5)
LDLC SERPL CALC-MCNC: 165 MG/DL (CALC)
MAGNESIUM SERPL-MCNC: 2 MG/DL (ref 1.5–2.5)
MCH RBC QN AUTO: 29.2 PG (ref 27–33)
MCHC RBC AUTO-ENTMCNC: 32.7 G/DL (ref 32–36)
MCV RBC AUTO: 89.3 FL (ref 80–100)
NONHDLC SERPL-MCNC: 184 MG/DL (CALC)
PLATELET # BLD AUTO: 293 THOUSAND/UL (ref 140–400)
PMV BLD REES-ECKER: 9.6 FL (ref 7.5–12.5)
POTASSIUM SERPL-SCNC: 4.3 MMOL/L (ref 3.5–5.3)
PROT SERPL-MCNC: 6.8 G/DL (ref 6.1–8.1)
RBC # BLD AUTO: 4.31 MILLION/UL (ref 3.8–5.1)
SODIUM SERPL-SCNC: 133 MMOL/L (ref 135–146)
TRIGL SERPL-MCNC: 83 MG/DL
WBC # BLD AUTO: 6.7 THOUSAND/UL (ref 3.8–10.8)

## 2025-06-30 ENCOUNTER — APPOINTMENT (OUTPATIENT)
Dept: GASTROENTEROLOGY | Facility: CLINIC | Age: OVER 89
End: 2025-06-30
Payer: MEDICARE

## 2025-07-03 PROCEDURE — 93227 XTRNL ECG REC<48 HR R&I: CPT | Performed by: INTERNAL MEDICINE

## 2025-07-22 ENCOUNTER — APPOINTMENT (OUTPATIENT)
Dept: CARDIOLOGY | Facility: CLINIC | Age: OVER 89
End: 2025-07-22
Payer: MEDICARE

## 2025-07-22 VITALS — SYSTOLIC BLOOD PRESSURE: 188 MMHG | DIASTOLIC BLOOD PRESSURE: 72 MMHG | HEART RATE: 78 BPM

## 2025-07-22 DIAGNOSIS — K21.9 GASTROESOPHAGEAL REFLUX DISEASE WITHOUT ESOPHAGITIS: ICD-10-CM

## 2025-07-22 DIAGNOSIS — I10 ESSENTIAL (PRIMARY) HYPERTENSION: ICD-10-CM

## 2025-07-22 DIAGNOSIS — M19.90 OSTEOARTHRITIS, UNSPECIFIED OSTEOARTHRITIS TYPE, UNSPECIFIED SITE: ICD-10-CM

## 2025-07-22 DIAGNOSIS — R09.89 BILATERAL CAROTID BRUITS: ICD-10-CM

## 2025-07-22 DIAGNOSIS — I21.4 NON-ST ELEVATION (NSTEMI) MYOCARDIAL INFARCTION (MULTI): ICD-10-CM

## 2025-07-22 DIAGNOSIS — R91.1 PULMONARY NODULE: ICD-10-CM

## 2025-07-22 DIAGNOSIS — R06.02 SHORTNESS OF BREATH: ICD-10-CM

## 2025-07-22 DIAGNOSIS — E78.2 MIXED HYPERLIPIDEMIA: ICD-10-CM

## 2025-07-22 DIAGNOSIS — R79.89 ELEVATED TROPONIN: ICD-10-CM

## 2025-07-22 DIAGNOSIS — I35.0 NONRHEUMATIC AORTIC VALVE STENOSIS: ICD-10-CM

## 2025-07-22 DIAGNOSIS — I35.1 NONRHEUMATIC AORTIC (VALVE) INSUFFICIENCY: ICD-10-CM

## 2025-07-22 DIAGNOSIS — R07.9 CHEST PAIN, UNSPECIFIED TYPE: ICD-10-CM

## 2025-07-22 DIAGNOSIS — E03.9 HYPOTHYROIDISM, UNSPECIFIED TYPE: ICD-10-CM

## 2025-07-22 DIAGNOSIS — R94.31 ABNORMAL EKG: ICD-10-CM

## 2025-07-22 DIAGNOSIS — R60.9 EDEMA, UNSPECIFIED TYPE: ICD-10-CM

## 2025-07-22 PROCEDURE — 99214 OFFICE O/P EST MOD 30 MIN: CPT | Performed by: INTERNAL MEDICINE

## 2025-07-22 PROCEDURE — 1159F MED LIST DOCD IN RCRD: CPT | Performed by: INTERNAL MEDICINE

## 2025-07-22 PROCEDURE — 3078F DIAST BP <80 MM HG: CPT | Performed by: INTERNAL MEDICINE

## 2025-07-22 PROCEDURE — 1036F TOBACCO NON-USER: CPT | Performed by: INTERNAL MEDICINE

## 2025-07-22 PROCEDURE — 3077F SYST BP >= 140 MM HG: CPT | Performed by: INTERNAL MEDICINE

## 2025-07-22 RX ORDER — SPIRONOLACTONE 25 MG/1
25 TABLET ORAL DAILY
Qty: 90 TABLET | Refills: 1 | Status: SHIPPED | OUTPATIENT
Start: 2025-07-22 | End: 2026-01-18

## 2025-07-22 RX ORDER — FUROSEMIDE 20 MG/1
TABLET ORAL
Qty: 45 TABLET | Refills: 1 | Status: SHIPPED | OUTPATIENT
Start: 2025-07-23

## 2025-07-22 RX ORDER — ROSUVASTATIN CALCIUM 10 MG/1
10 TABLET, COATED ORAL NIGHTLY
Qty: 90 TABLET | Refills: 3 | Status: SHIPPED | OUTPATIENT
Start: 2025-07-22 | End: 2026-07-22

## 2025-07-22 RX ORDER — EZETIMIBE 10 MG/1
10 TABLET ORAL DAILY
Qty: 90 TABLET | Refills: 3 | Status: SHIPPED | OUTPATIENT
Start: 2025-07-22 | End: 2026-07-22

## 2025-07-22 NOTE — PROGRESS NOTES
CARDIOLOGY OFFICE NOTE     Date:   7/22/2025    Patient:    Nayana Lemos    YOB: 1929    Primary Physician: Ashwini Urena MD         REASON FOR VISIT / CHIEF COMPLAINT:     Cardiology follow-up.    HPI:     Nayana Lemos was seen in cardiac evaluation at the [facility] office July 22, 2025.      The patients problems are listed as in the impression below.    Electronic medical records reviewed.    Patient returns.  Feels about the same.  She held her medicines today.  Her blood pressure systolically is 188.  Given her advanced age she is not very active.  She does get up to go to the bathroom at night.    She still has some shortness of breath and chest pain symptoms but have improved.  She has a chronic lower extremity edema.    Patient denies Lightheadedness, Dizziness, TIA or CVA symptoms.  No CHF.  No Palpitations.  No GI,  or Bleeding Issues. No Recent Fever or Chills.     Cardiovascular and general review of systems is otherwise negative.    A 14-system review is otherwise negative, other than noted.     PHYSICAL EXAMINATION:      Vitals:    07/22/25 1009   BP: (!) 188/72   Pulse: 78     General: No acute distress.  Alert and oriented.  Head And Neck Examination: No jugular venous distention.bilateral carotid bruits No masses. Carotid upstrokes preserved. Oral mucosa moist.  No xanthelasma. Head and neck examination otherwise unremarkable.  Lungs: Clear to auscultation and percussion. No wheezes, no rales,  and no rhonchi.  Chest: Excursion appeared to be normal. No chest wall tenderness on palpation.  Heart: Normal S1 and S2. No S3. No S4. No rub. Grade 3/6 systolic murmur, best heard at the left sternal border. Point of maximal impulse was within normal limits.  Abdomen: Soft. Nontender. No organomegaly. No bruits. No masses.   Extremities: 2+ bipedal edema. No clubbing. No cyanosis.  Pulses are strong throughout. No bruits.  Musculoskeletal Exam: No ulcers, otherwise  unremarkable.  Neuro: Neurologically appeared grossly intact.  .  IMPRESSION:       Cardiovascular status stable   Shortness of breath  Chest pain symptoms  Edema  Hypertension  Hyperlipidemia, , 6/2024  Systolic cardiac murmur  Aortic valve stenosis, moderate, V-max 3.6, peak mean gradients 39 and 18 respectively, echocardiogram, 5/2025.  Aortic insufficiency, moderate  Mitral annular calcification, moderate  Normal LV systolic function, LVEF 50 to 55%, echocardiogram, 5/2025  LV diastolic dysfunction  Abnormal ECG, question prior myocardial infarction  Non-ST elevation MI, elevated troponins, 4/2025.  Paroxysmal supraventricular tachycardia, possible PAT salvos, Holter monitor, 6/2025.  Bilateral carotid bruits, negative carotid ultrasound 6/2025.  Negative CT angiography for pulmonary embolism 4/2025.  Abnormal CT of the chest with indeterminant pleural-based nodularity, 4/2025.  Hypothyroidism  Degenerative joint disease  GERD  Otherwise as per assessment below.    RECOMMENDATIONS:      The patient's blood pressure is elevated.  Would suggest changing her medications to assist in her blood pressure and her edema with the following: Lasix 10 mg Monday Wednesday Friday and 25 mg daily at this time.  Blood pressure monitoring will be obtained at home and daughters will let us know.    In terms of her hyperlipidemia we will suggest increasing her Crestor to 10 mg daily and adding Zetia 10 mg daily as tolerated    She will continue her other medications.  Refills were provided.    Exercise dietary program as tolerated.  Hydration.    [employer] portal use was encouraged.    We will plan to see back in 2 months with Laboratory Studies and ECG as ordered.     Patient will follow up with their primary physician for general care.    The patient knows to contact medical care earlier if need be.      ALLERGIES:     Statins-hmg-coa reductase inhibitors     MEDICATIONS:     Current Outpatient Medications   Medication  Instructions    amLODIPine (NORVASC) 5 mg, oral, Daily    aspirin 81 mg, oral, Daily    cholecalciferol (VITAMIN D3) 1,000 Units, 3 times weekly    isosorbide mononitrate ER (IMDUR) 60 mg, oral, Daily    levothyroxine (SYNTHROID, LEVOXYL) 25 mcg, Daily    lisinopril 40 mg, Daily    metoprolol succinate XL (TOPROL-XL) 25 mg, oral, Daily, Do not crush or chew.    nitroglycerin (NITROSTAT) 0.4 mg, sublingual, Every 5 min PRN, May repeat dose every 5 minutes for up to 3 doses total.    omeprazole (PriLOSEC) 40 mg DR capsule 1 capsule, Daily (630)    rosuvastatin (CRESTOR) 5 mg, oral, Nightly       ELECTROCARDIOGRAM:      None this visit    CARDIAC TESTIN-hour Holter monitor, 2025:  Predominant sinus rhythm.  Rest average heart rate 70 bpm.    Minimum maximal heart rates 43 and 126  Paroxysmal supraventricular tachycardia salvos, sinus tachycardia versus paroxysmal atrial tachycardia.  No other significant dysrhythmias.    No atrial fibrillation.  No symptoms.    Carotid ultrasound, 2025:  Less than 50% bilateral ICAs.      LABORATORY DATA:      CBC:   Lab Results   Component Value Date    WBC 6.7 2025    RBC 4.31 2025    HGB 12.6 2025    HCT 38.5 2025     2025        CMP:    Lab Results   Component Value Date     (L) 2025    K 4.3 2025    CL 99 2025    CO2 24 2025    BUN 12 2025    CREATININE 0.50 (L) 2025    GLUCOSE 89 2025    CALCIUM 8.8 2025       Magnesium:    Lab Results   Component Value Date    MG 2.0 2025       Lipid Profile:    Lab Results   Component Value Date    CHOL 249 (H) 2025    TRIG 83 2025    HDL 65 2025    LDLCALC 165 (H) 2025    LDLDIRECT 155 (H) 08/10/2022       Hepatic Function Panel:    Lab Results   Component Value Date    ALKPHOS 88 2025    ALT 7 2025    AST 14 2025    PROT 6.8 2025    BILITOT 0.9 2025    BILIDIR 0.2 2025        TSH:    Lab Results   Component Value Date    TSH 4.20 (H) 07/18/2024       HgBA1c:    Lab Results   Component Value Date    HGBA1C 5.4 07/22/2021       BNP:   Lab Results   Component Value Date     (H) 04/05/2025        PT/INR:    Lab Results   Component Value Date    PROTIME 11.8 08/30/2020    INR 1.0 08/30/2020       Cardiac Enzymes:    Lab Results   Component Value Date    TROPHS 120 (HH) 04/05/2025    TROPHS 34 (H) 04/05/2025           PROBLEM LIST:     Problem List[1]          Cody Haider MD, FACC   [facility] /  Cardiology      Of Note:  edjing voice recognition dictation software was utilized partially in the preparation of this note, therefore, inaccuracies in spelling, word choice and punctuation may have occurred which were not recognized at the time of signing.    Patient was seen and examined with total time of visit including chart preparation, rooming, and chart completion exceeding 40 minutes.      I, Dannielle Chang RN, am scribing for, and in the presence of Dr. Cody Haider MD, [facility].    I, [facility]ider MD, [facility], personally performed the services described in the documentation as scribed by Dannielle Chang RN, in my presence, and confirm it is both accurate and complete.              [1]   Patient Active Problem List  Diagnosis    Essential (primary) hypertension    Elevated troponin    Chest pain    Bilateral carotid bruits    Mixed hyperlipidemia    Abnormal EKG    Nonrheumatic aortic valve stenosis    Shortness of breath    Nonrheumatic aortic (valve) insufficiency    Non-ST elevation (NSTEMI) myocardial infarction (Multi)    Pulmonary nodule    Osteoarthritis    Gastroesophageal reflux disease without esophagitis    Hypothyroidism    Weakness with dizziness

## 2025-07-22 NOTE — PATIENT INSTRUCTIONS
FOLLOW UP WITH Dr. Cody Haider MD, [facility] IN 2 MONTHS    FASTING LABS TO BE DONE 1 WEEK PRIOR TO NEXT APPT     CHECK BLOOD PRESSURE A COUPLE TIMES PER WEEK/PLEASE CALL THE OFFICE IF BLOOD PRESSURE GETS TOO LOW     START TAKING ZETIA 10MG TAKE ONE TABLET DAILY     START TAKING ROSUVASTATIN 10MG TAKE ONE TABLET DAILY     START TAKING LASIX 10MG TAKE ONE TABLET MONDAY WEDNESDAY AND FRIDAY    START TAKING ALDACTONE 25MG TAKE ONE TABLET DAILY      TRY AND PURCHASE SUPPORT STOCKINGS WITH ZIPPERS IN ORDER TO REDUCE LOWER LEG SWELLING     DID YOU KNOW  We have a pharmacy here in the [facility].  They can fill all prescriptions, not just cardiac medications.  Prescriptions from other pharmacies can easily be transferred to the  pharmacy by the  pharmacist on site.   pharmacies offer FREE HOME DELIVERY on medications to anywhere in [place]. They can sync your medications. Typically prescriptions can be ready in 10 - 15 minutes. If pharmacy is unable to fill your  prescription or if cost is more than your paying now the Pharmacist can easily transfer back to your Pharmacy of choice. Pharmacy phone # [phone].     Please bring all medicines, vitamins, and herbal supplements with you in original bottles to every appointment! This is the best way to ensure your medication list in your chart is accurate.    Prescriptions will not be filled unless you are compliant with your follow up appointments or have a follow up appointment scheduled as per instruction of your physician. Refills should be requested at the time of your visit.

## 2025-09-23 ENCOUNTER — APPOINTMENT (OUTPATIENT)
Dept: CARDIOLOGY | Facility: CLINIC | Age: OVER 89
End: 2025-09-23
Payer: MEDICARE